# Patient Record
Sex: MALE | Race: WHITE | URBAN - METROPOLITAN AREA
[De-identification: names, ages, dates, MRNs, and addresses within clinical notes are randomized per-mention and may not be internally consistent; named-entity substitution may affect disease eponyms.]

---

## 2017-07-27 ENCOUNTER — OFFICE VISIT (OUTPATIENT)
Dept: FAMILY MEDICINE CLINIC | Facility: CLINIC | Age: 32
End: 2017-07-27

## 2017-07-27 VITALS
DIASTOLIC BLOOD PRESSURE: 80 MMHG | SYSTOLIC BLOOD PRESSURE: 128 MMHG | BODY MASS INDEX: 23.62 KG/M2 | RESPIRATION RATE: 16 BRPM | TEMPERATURE: 98 F | HEART RATE: 64 BPM | OXYGEN SATURATION: 98 % | HEIGHT: 70 IN | WEIGHT: 165 LBS

## 2017-07-27 DIAGNOSIS — L30.9 ECZEMA, UNSPECIFIED TYPE: Primary | ICD-10-CM

## 2017-07-27 PROCEDURE — 99202 OFFICE O/P NEW SF 15 MIN: CPT | Performed by: NURSE PRACTITIONER

## 2017-07-27 RX ORDER — PREDNISONE 20 MG/1
20 TABLET ORAL DAILY
Qty: 5 TABLET | Refills: 0 | Status: SHIPPED | OUTPATIENT
Start: 2017-07-27 | End: 2017-08-01

## 2017-07-27 RX ORDER — TRIAMCINOLONE ACETONIDE 0.25 MG/ML
LOTION TOPICAL
Qty: 60 ML | Refills: 0 | Status: SHIPPED | OUTPATIENT
Start: 2017-07-27

## 2017-07-27 NOTE — PATIENT INSTRUCTIONS
Take Claritin, zyrtec or allegra daily    Nonspecific Dermatitis  Dermatitis is a skin rash caused by something that touches the skin and makes it irritated and inflamed.  Your skin may be red, swollen, dry, and may be cracked.  Blisters may form and ooze · You can also help relieve large areas of itching by taking a lukewarm bath with colloidal oatmeal added to the water. · Use hydrocortisone cream for redness and irritation, unless another medicine was prescribed.  You can also use benzocaine anesthetic c

## 2017-07-27 NOTE — PROGRESS NOTES
CHIEF COMPLAINT:   Patient presents with:  Derm Problem: on head          HPI:   Jaylen Bill is a 27year old male who presents for evaluation of a rash. Per patient rash started in the past several years with current flare for about 7 days.  Rash has No erythema of the throat. Oropharynx moist without lesions. LUNGS: Clear to auscultation bilaterally. No wheezing, rhonchi, or rales. No diminished breath sounds. No increased work of breathing.    CARDIO: RRR without murmur  JOINTS: normal ROM  LYMPH:

## 2017-09-22 ENCOUNTER — OFFICE VISIT (OUTPATIENT)
Dept: FAMILY MEDICINE CLINIC | Facility: CLINIC | Age: 32
End: 2017-09-22

## 2017-09-22 VITALS
HEIGHT: 70 IN | BODY MASS INDEX: 23.62 KG/M2 | DIASTOLIC BLOOD PRESSURE: 82 MMHG | TEMPERATURE: 99 F | OXYGEN SATURATION: 98 % | SYSTOLIC BLOOD PRESSURE: 158 MMHG | WEIGHT: 165 LBS | HEART RATE: 92 BPM

## 2017-09-22 DIAGNOSIS — L57.8 SOLAR DERMATITIS: Primary | ICD-10-CM

## 2017-09-22 PROCEDURE — 99213 OFFICE O/P EST LOW 20 MIN: CPT | Performed by: FAMILY MEDICINE

## 2017-09-22 RX ORDER — PREDNISONE 10 MG/1
TABLET ORAL
Qty: 30 TABLET | Refills: 0 | Status: SHIPPED | OUTPATIENT
Start: 2017-09-22

## 2017-09-22 RX ORDER — AMOXICILLIN 875 MG/1
875 TABLET, COATED ORAL 2 TIMES DAILY
Qty: 14 TABLET | Refills: 0 | Status: SHIPPED | OUTPATIENT
Start: 2017-09-22 | End: 2017-09-29

## 2017-09-22 NOTE — PATIENT INSTRUCTIONS
Take prednisone taper as directed. Take with food, steroids can cause nausea on empty stomach. Take antibiotics with food and plenty of water. Eat yogurt or take probiotic daily.  (Alyssa Holden is a good example of an OTC probiotic)  Make sure to finish the e

## 2017-09-23 NOTE — PROGRESS NOTES
CHIEF COMPLAINT:   Patient presents with:  Skin: pt c/o rash on forehead, neck  and arms per pt, has redness and itching in area.   States he  was seen and was given meds but rash  never went away completely and is gradually getting worse last few days not on file      REVIEW OF SYSTEMS:   GENERAL: feels well otherwise, no fever, no chills. SKIN: Per HPI. No edema. No ulcerations. HEENT: Denies rhinorrhea, edema of the lips or swelling of throat. CARDIOVASCULAR: Denies chest pains or palpitations.   ADELE Instructions. Skin care discussed with patient.      Meds & Refills for this Visit:    Signed Prescriptions Disp Refills    predniSONE 10 MG Oral Tab 30 tablet 0      Sig: TAKE 4 TABS DAILY FOR 3 DAYS, THEN 3 TABS DAILY FOR 3 DAYS, THEN 2 TABS DAILY FOR 3

## 2018-10-31 ENCOUNTER — CHARTING TRANS (OUTPATIENT)
Dept: OTHER | Age: 33
End: 2018-10-31